# Patient Record
Sex: FEMALE | Race: WHITE | NOT HISPANIC OR LATINO
[De-identification: names, ages, dates, MRNs, and addresses within clinical notes are randomized per-mention and may not be internally consistent; named-entity substitution may affect disease eponyms.]

---

## 2018-02-22 ENCOUNTER — RESULT REVIEW (OUTPATIENT)
Age: 68
End: 2018-02-22

## 2020-08-18 ENCOUNTER — TRANSCRIPTION ENCOUNTER (OUTPATIENT)
Age: 70
End: 2020-08-18

## 2021-06-17 ENCOUNTER — EMERGENCY (EMERGENCY)
Facility: HOSPITAL | Age: 71
LOS: 1 days | Discharge: HOME | End: 2021-06-17
Attending: EMERGENCY MEDICINE | Admitting: EMERGENCY MEDICINE
Payer: MEDICARE

## 2021-06-17 VITALS
OXYGEN SATURATION: 97 % | RESPIRATION RATE: 18 BRPM | HEART RATE: 87 BPM | SYSTOLIC BLOOD PRESSURE: 124 MMHG | DIASTOLIC BLOOD PRESSURE: 84 MMHG | TEMPERATURE: 98 F

## 2021-06-17 VITALS
DIASTOLIC BLOOD PRESSURE: 80 MMHG | OXYGEN SATURATION: 97 % | RESPIRATION RATE: 18 BRPM | WEIGHT: 130.07 LBS | SYSTOLIC BLOOD PRESSURE: 146 MMHG | TEMPERATURE: 99 F | HEIGHT: 62 IN | HEART RATE: 81 BPM

## 2021-06-17 PROCEDURE — 71250 CT THORAX DX C-: CPT | Mod: 26,MA

## 2021-06-17 PROCEDURE — 71250 CT THORAX DX C-: CPT | Mod: MA

## 2021-06-17 PROCEDURE — 99284 EMERGENCY DEPT VISIT MOD MDM: CPT

## 2021-06-17 RX ORDER — OXYCODONE AND ACETAMINOPHEN 5; 325 MG/1; MG/1
1 TABLET ORAL ONCE
Refills: 0 | Status: DISCONTINUED | OUTPATIENT
Start: 2021-06-17 | End: 2021-06-17

## 2021-06-17 RX ORDER — IBUPROFEN 200 MG
600 TABLET ORAL ONCE
Refills: 0 | Status: COMPLETED | OUTPATIENT
Start: 2021-06-17 | End: 2021-06-17

## 2021-06-17 RX ADMIN — Medication 600 MILLIGRAM(S): at 22:36

## 2021-06-17 RX ADMIN — OXYCODONE AND ACETAMINOPHEN 1 TABLET(S): 5; 325 TABLET ORAL at 23:38

## 2021-06-17 RX ADMIN — Medication 600 MILLIGRAM(S): at 23:36

## 2021-06-17 NOTE — ED PROVIDER NOTE - CARE PLAN
Principal Discharge DX:	Back pain   Principal Discharge DX:	Closed fracture of multiple ribs of left side, initial encounter

## 2021-06-17 NOTE — ED ADULT TRIAGE NOTE - CHIEF COMPLAINT QUOTE
Pt was playing with grandson in pool and slipped and hit her back on the concrete. Pt complaining of L sided back pain. No other symptoms. Did not hit head and no LOC Pt was playing with grandson in pool and slipped and hit her back on the concrete. Pt complaining of L sided back pain. No other symptoms. Did not hit head and no LOC. Pt ISAR Negative

## 2021-06-17 NOTE — ED PROVIDER NOTE - ATTENDING CONTRIBUTION TO CARE
I have personally seen and examined this patient. I have fully participated in the care of this patient. I have reviewed all pertinent clinical information, including history physical exam, plan and the PA's note and agree except as noted  69 yo female, no pmh comes to the ED co left sided lower rib pain sp slip and fall this afternoon. As per patient was getting into the pool and slit hitting her lower back on the cement wall.  Abrasion noted to left lower back where hit.  Denies hitting her head, LOC, difficulty breathing or any other complaints. Took Motrin 600 mg  6-8 hours ago with temporary relief.

## 2021-06-17 NOTE — ED PROVIDER NOTE - PATIENT PORTAL LINK FT
You can access the FollowMyHealth Patient Portal offered by [facility] by registering at the following website: [email or web address]. By joining [facility]’s FollowMyHealth portal, you will also be able to view your health information using other applications (apps) compatible with our system.

## 2021-06-17 NOTE — ED PROVIDER NOTE - CLINICAL SUMMARY MEDICAL DECISION MAKING FREE TEXT BOX
71 yo female, no pmh comes to the ED co left sided lower rib pain sp slip and fall this afternoon. As per patient was getting into the pool and slit hitting her lower back on the cement wall.  Abrasion noted to left lower back where hit.  Denies hitting her head, LOC, difficulty breathing or any other complaints. Took Motrin 600 mg  6-8 hours ago with temporary relief.  pain control,  Ct chest, re-eval

## 2021-06-17 NOTE — ED ADULT TRIAGE NOTE - NS ED NURSE BANDS TYPE
HOLD WELLBUTRIN UNTIL SEEN BY KIDNEY DR>    Call Dr. Nancy Dominguez office about worsening kidney function and if you should see a nephrologist.  Weight Management: Overcoming Your Barriers  You may have many reasons why you’re not ready to lose weight.  You ma Do you eat more because you feel bad about yourself, then feel even worse as you gain weight? This is a “vicious cycle.” Breaking this cycle is not easy. You may need group support or counseling.  Always remember that you are a valuable person, no matter wh Name band;

## 2021-06-17 NOTE — ED ADULT NURSE NOTE - NSIMPLEMENTINTERV_GEN_ALL_ED
Implemented All Universal Safety Interventions:  [place] to call system. Call bell, personal items and telephone within reach. Instruct patient to call for assistance. Room bathroom lighting operational. Non-slip footwear when patient is off stretcher. Physically safe environment: no spills, clutter or unnecessary equipment. Stretcher in lowest position, wheels locked, appropriate side rails in place.

## 2021-06-17 NOTE — ED ADULT NURSE NOTE - CHIEF COMPLAINT QUOTE
Pt was playing with grandson in pool and slipped and hit her back on the concrete. Pt complaining of L sided back pain. No other symptoms. Did not hit head and no LOC. Pt ISAR Negative

## 2021-06-17 NOTE — ED PROVIDER NOTE - NSFOLLOWUPCLINICS_GEN_ALL_ED_FT
[facility]  Geriatrics  [address]  [place] [address]  Phone: [phone]  Fax:

## 2021-06-17 NOTE — ED ADULT NURSE NOTE - OBJECTIVE STATEMENT
70 yr old female ambulatory to ED A&Ox4 presents with +back pain s/p fall today by pool.  Pt hit back on concrete.  Denies any LOC or head trauma.  Pt denies any numbness or tingling to ext.  No weakness to ext. No bowel or urinary incontinence. +redness noted to lower back. No ecchymosis noted. No acute resp distress noted. Resp even and unlabored. MELGAR. Skin warm, dry and intact.   at bedside. Safety maintained.

## 2021-06-17 NOTE — ED PROVIDER NOTE - OBJECTIVE STATEMENT
71 yo female, no pmh comes to the ED co left sided lower rib pain sp slip and fall this afternoon. As per patient was getting into the pool and slit hitting her lower back on the cement wall.  Abrasion noted to left lower back where hit.  Denies hitting her head, LOC, difficulty breathing or any other complaints. Took Motrin 600 mg  6-8 hours ago with temporary relief.

## 2021-08-19 ENCOUNTER — TRANSCRIPTION ENCOUNTER (OUTPATIENT)
Age: 71
End: 2021-08-19

## 2021-12-23 ENCOUNTER — TRANSCRIPTION ENCOUNTER (OUTPATIENT)
Age: 71
End: 2021-12-23

## 2021-12-28 ENCOUNTER — TRANSCRIPTION ENCOUNTER (OUTPATIENT)
Age: 71
End: 2021-12-28

## 2022-11-11 ENCOUNTER — NON-APPOINTMENT (OUTPATIENT)
Age: 72
End: 2022-11-11

## 2022-11-11 ENCOUNTER — APPOINTMENT (OUTPATIENT)
Dept: ORTHOPEDIC SURGERY | Facility: CLINIC | Age: 72
End: 2022-11-11

## 2022-11-11 VITALS — BODY MASS INDEX: 24.29 KG/M2 | WEIGHT: 132 LBS | HEIGHT: 62 IN

## 2022-11-11 PROBLEM — Z78.9 OTHER SPECIFIED HEALTH STATUS: Chronic | Status: ACTIVE | Noted: 2021-06-17

## 2022-11-11 PROBLEM — Z00.00 ENCOUNTER FOR PREVENTIVE HEALTH EXAMINATION: Status: ACTIVE | Noted: 2022-11-11

## 2022-11-11 PROCEDURE — 72100 X-RAY EXAM L-S SPINE 2/3 VWS: CPT

## 2022-11-11 PROCEDURE — 99204 OFFICE O/P NEW MOD 45 MIN: CPT

## 2022-11-11 RX ORDER — MELOXICAM 15 MG/1
15 TABLET ORAL
Qty: 14 | Refills: 0 | Status: ACTIVE | COMMUNITY
Start: 2022-11-11 | End: 1900-01-01

## 2022-11-13 NOTE — PHYSICAL EXAM
[de-identified] : Gen: in no acute distress, seated comfortably, moving easily\par Skin: No discoloration, rashes; on palpation skin is dry, \par Neuro: Normal sensation all dermatomes, motor all myotomes\par Vascular: Normal pulses, no edema, normal temperature\par Coordination and balance: Normal\par Psych: normal mood and affect, non pressured speech, alert and oriented x3\par \par LEFT HIP /PELVIS -\par Inspection reveals no bruising\par Range of motion testing shows full passive and active\par no pain with resisted adduction\par no pain with resisted hip flexion\par Hip maneuvers:\par passive hip impingement sign negative\par JOVAN negative\par Log roll negative\par Resisted active straight leg raise negative\par + TTP of the left buttock, greater trochanters, IT band \par \par NEURO - Normal bulk and tone \par LE strength 5/5 including hip flexion, knee flexion, knee extension, ankle dorsiflexion, ankle plantarflexion, eversion, and EHL \par Sensation - intact to light touch in bilateral lower extremities. \par LE Reflexes 2+ patellar and Achilles reflexes bilaterally \par no Clonus\par Coordination was age appropriate and intact in all 4 limbs. \par GAIT - Normal base, normal stride length, non-antalgic  [de-identified] : The following radiographs were ordered and read by me during this patient's visit. I reviewed each radiograph in detail with the patient and discussed the findings as highlighted below. \par \par 2 views of the lumbar spine were obtained today that show anterolisthesis L5/S1.

## 2022-11-13 NOTE — DISCUSSION/SUMMARY
[de-identified] : Discussed findings of today's exam and possible causes of patient's pain.  Educated patient on their most probable diagnosis of spondylolisthesis at L5/S1. Reviewed possible courses of treatment, and we collaboratively decided best course of treatment at this time will include a course of meloxicam and referral to physical therapy. MRI ordered to further evaluate. Follow up after imaging. \par \par Ligia Petty MD, EdM\par Sports Medicine [facility]\par Department of Orthopedics

## 2022-11-13 NOTE — HISTORY OF PRESENT ILLNESS
[de-identified] : BRIDGETT SANTOS is a 72 year old female who presents for right gluteal pain. Pain started 2 weeks ago while playing soccer with her grandkids. Pain is worse with walking and sitting down makes the pain better. She describes the pain as dull, rated a 7/10 in intensity. Localized to the lateral and posterior thigh. She has been taking Advil PRN. Pain is worse first thing in the morning, but is constant.\par Denies any other prior injuries. Denies bowel/bladder changes, fevers, chills, saddle anesthesia. Denies numbness, tingling, weakness of the lower extremities. \par

## 2022-11-13 NOTE — ADDENDUM
[FreeTextEntry1] : I, Jennifer Muñoz, assisted with documentation on 11/11/2022  acting as scribe for Dr. Ligia Petty.\par \par I, Ligia Petty MD, EdM, have read and attest that all the information, medical decision making and discharge instructions within are true and accurate.

## 2022-11-14 ENCOUNTER — APPOINTMENT (OUTPATIENT)
Dept: MRI IMAGING | Facility: HOSPITAL | Age: 72
End: 2022-11-14

## 2022-11-14 ENCOUNTER — OUTPATIENT (OUTPATIENT)
Dept: OUTPATIENT SERVICES | Facility: HOSPITAL | Age: 72
LOS: 1 days | End: 2022-11-14
Payer: MEDICARE

## 2022-11-14 ENCOUNTER — APPOINTMENT (OUTPATIENT)
Dept: ORTHOPEDIC SURGERY | Facility: CLINIC | Age: 72
End: 2022-11-14

## 2022-11-14 DIAGNOSIS — M43.10 SPONDYLOLISTHESIS, SITE UNSPECIFIED: ICD-10-CM

## 2022-11-14 PROCEDURE — 72148 MRI LUMBAR SPINE W/O DYE: CPT

## 2022-11-14 PROCEDURE — 72148 MRI LUMBAR SPINE W/O DYE: CPT | Mod: 26,MH

## 2022-11-18 ENCOUNTER — APPOINTMENT (OUTPATIENT)
Dept: ORTHOPEDIC SURGERY | Facility: CLINIC | Age: 72
End: 2022-11-18

## 2022-11-18 DIAGNOSIS — M43.10 SPONDYLOLISTHESIS, SITE UNSPECIFIED: ICD-10-CM

## 2022-11-18 PROCEDURE — 99214 OFFICE O/P EST MOD 30 MIN: CPT

## 2022-11-20 PROBLEM — M43.10 SPONDYLOLISTHESIS: Status: ACTIVE | Noted: 2022-11-11

## 2023-08-24 ENCOUNTER — NON-APPOINTMENT (OUTPATIENT)
Age: 73
End: 2023-08-24